# Patient Record
Sex: FEMALE | Race: BLACK OR AFRICAN AMERICAN | NOT HISPANIC OR LATINO | Employment: FULL TIME | URBAN - METROPOLITAN AREA
[De-identification: names, ages, dates, MRNs, and addresses within clinical notes are randomized per-mention and may not be internally consistent; named-entity substitution may affect disease eponyms.]

---

## 2023-10-18 ENCOUNTER — OFFICE VISIT (OUTPATIENT)
Dept: PRIMARY CARE | Facility: CLINIC | Age: 43
End: 2023-10-18
Payer: COMMERCIAL

## 2023-10-18 ENCOUNTER — LAB (OUTPATIENT)
Dept: LAB | Facility: LAB | Age: 43
End: 2023-10-18
Payer: COMMERCIAL

## 2023-10-18 VITALS — DIASTOLIC BLOOD PRESSURE: 69 MMHG | HEART RATE: 80 BPM | SYSTOLIC BLOOD PRESSURE: 111 MMHG

## 2023-10-18 DIAGNOSIS — R79.89 ABNORMAL LFTS: ICD-10-CM

## 2023-10-18 DIAGNOSIS — N92.0 MENORRHAGIA WITH REGULAR CYCLE: Primary | ICD-10-CM

## 2023-10-18 DIAGNOSIS — D50.0 IRON DEFICIENCY ANEMIA DUE TO CHRONIC BLOOD LOSS: ICD-10-CM

## 2023-10-18 LAB
ALBUMIN SERPL BCP-MCNC: 3.9 G/DL (ref 3.4–5)
ALP SERPL-CCNC: 292 U/L (ref 33–110)
ALT SERPL W P-5'-P-CCNC: 27 U/L (ref 7–45)
ANION GAP SERPL CALC-SCNC: 13 MMOL/L (ref 10–20)
AST SERPL W P-5'-P-CCNC: 38 U/L (ref 9–39)
BASOPHILS # BLD AUTO: 0.04 X10*3/UL (ref 0–0.1)
BASOPHILS NFR BLD AUTO: 0.4 %
BILIRUB SERPL-MCNC: 0.3 MG/DL (ref 0–1.2)
BUN SERPL-MCNC: 9 MG/DL (ref 6–23)
CALCIUM SERPL-MCNC: 9.3 MG/DL (ref 8.6–10.6)
CHLORIDE SERPL-SCNC: 104 MMOL/L (ref 98–107)
CO2 SERPL-SCNC: 25 MMOL/L (ref 21–32)
CREAT SERPL-MCNC: 0.55 MG/DL (ref 0.5–1.05)
EOSINOPHIL # BLD AUTO: 0.15 X10*3/UL (ref 0–0.7)
EOSINOPHIL NFR BLD AUTO: 1.6 %
ERYTHROCYTE [DISTWIDTH] IN BLOOD BY AUTOMATED COUNT: 17.2 % (ref 11.5–14.5)
GFR SERPL CREATININE-BSD FRML MDRD: >90 ML/MIN/1.73M*2
GLUCOSE SERPL-MCNC: 82 MG/DL (ref 74–99)
HCT VFR BLD AUTO: 29.5 % (ref 36–46)
HGB BLD-MCNC: 8.2 G/DL (ref 12–16)
IMM GRANULOCYTES # BLD AUTO: 0.04 X10*3/UL (ref 0–0.7)
IMM GRANULOCYTES NFR BLD AUTO: 0.4 % (ref 0–0.9)
IRON SATN MFR SERPL: 4 % (ref 25–45)
IRON SERPL-MCNC: 20 UG/DL (ref 35–150)
LYMPHOCYTES # BLD AUTO: 3.12 X10*3/UL (ref 1.2–4.8)
LYMPHOCYTES NFR BLD AUTO: 33.7 %
MCH RBC QN AUTO: 20.8 PG (ref 26–34)
MCHC RBC AUTO-ENTMCNC: 27.8 G/DL (ref 32–36)
MCV RBC AUTO: 75 FL (ref 80–100)
MONOCYTES # BLD AUTO: 0.84 X10*3/UL (ref 0.1–1)
MONOCYTES NFR BLD AUTO: 9.1 %
NEUTROPHILS # BLD AUTO: 5.06 X10*3/UL (ref 1.2–7.7)
NEUTROPHILS NFR BLD AUTO: 54.8 %
NRBC BLD-RTO: 0 /100 WBCS (ref 0–0)
PLATELET # BLD AUTO: 383 X10*3/UL (ref 150–450)
PMV BLD AUTO: 10.4 FL (ref 7.5–11.5)
POTASSIUM SERPL-SCNC: 4.2 MMOL/L (ref 3.5–5.3)
PROT SERPL-MCNC: 8.2 G/DL (ref 6.4–8.2)
RBC # BLD AUTO: 3.94 X10*6/UL (ref 4–5.2)
SODIUM SERPL-SCNC: 138 MMOL/L (ref 136–145)
TIBC SERPL-MCNC: 466 UG/DL (ref 240–445)
UIBC SERPL-MCNC: 446 UG/DL (ref 110–370)
WBC # BLD AUTO: 9.3 X10*3/UL (ref 4.4–11.3)

## 2023-10-18 PROCEDURE — 83540 ASSAY OF IRON: CPT

## 2023-10-18 PROCEDURE — 85025 COMPLETE CBC W/AUTO DIFF WBC: CPT

## 2023-10-18 PROCEDURE — 36415 COLL VENOUS BLD VENIPUNCTURE: CPT

## 2023-10-18 PROCEDURE — 83550 IRON BINDING TEST: CPT

## 2023-10-18 PROCEDURE — 99204 OFFICE O/P NEW MOD 45 MIN: CPT | Performed by: STUDENT IN AN ORGANIZED HEALTH CARE EDUCATION/TRAINING PROGRAM

## 2023-10-18 PROCEDURE — 80053 COMPREHEN METABOLIC PANEL: CPT

## 2023-10-18 RX ORDER — ASCORBIC ACID 500 MG
500 TABLET ORAL DAILY
Qty: 30 TABLET | Refills: 11 | Status: SHIPPED | OUTPATIENT
Start: 2023-10-18 | End: 2024-10-17

## 2023-10-18 RX ORDER — DOCUSATE SODIUM 100 MG/1
100 CAPSULE, LIQUID FILLED ORAL 2 TIMES DAILY PRN
Qty: 60 CAPSULE | Refills: 0 | Status: SHIPPED | OUTPATIENT
Start: 2023-10-18

## 2023-10-18 RX ORDER — FERROUS SULFATE 325(65) MG
1 TABLET ORAL
COMMUNITY
Start: 2018-11-14

## 2023-10-18 NOTE — PROGRESS NOTES
Subjective   Patient ID: Idalia Ibarra is a 42 y.o. female who presents for EST CARE   HPI  Kori is 42 years old with known iron deficiency anemia, hyperlipidemia.  Reports she recently saw another doctor and got her blood work done which showed iron deficiency, hyperlipidemia and abnormal LFT.  Was given iron supplements and vitamin C however wants to further know what caused this.  Denies any fatigue or nausea or vomiting or changes in bowel habits      MEDICAL HISTORY   Was on seizures meds- stipped taking them since a year   C sections X s  Hospitalixations for seizures   Family History   Problem Relation Name Age of Onset    Diabetes Neg Hx      Hypertension Neg Hx      Heart disease Neg Hx      Anemia Neg Hx      Cancer Neg Hx        Allergies   Allergen Reactions    Shellfish Containing Products Anaphylaxis        Menstrual history:   Regular   5 days   Clots +  Lmp: August - 14th    Review of Systems   Constitutional:  Negative for activity change and fever.   HENT:  Negative for congestion.    Respiratory:  Negative for cough, shortness of breath and wheezing.    Cardiovascular:  Negative for chest pain and leg swelling.   Gastrointestinal:  Negative for abdominal pain, constipation, nausea and vomiting.   Endocrine: Negative for cold intolerance.   Genitourinary:  Negative for dysuria, hematuria and urgency.   Neurological:  Negative for dizziness, speech difficulty, weakness and numbness.   Psychiatric/Behavioral:  Negative for self-injury and suicidal ideas.        Objective   Visit Vitals  /69   Pulse 80      Physical Exam  Constitutional:       Appearance: Normal appearance.   HENT:      Head: Normocephalic and atraumatic.      Nose: Nose normal.      Mouth/Throat:      Mouth: Mucous membranes are moist.   Eyes:      Conjunctiva/sclera: Conjunctivae normal.      Pupils: Pupils are equal, round, and reactive to light.   Cardiovascular:      Rate and Rhythm: Normal rate and regular rhythm.       Pulses: Normal pulses.      Heart sounds: Normal heart sounds.   Pulmonary:      Effort: Pulmonary effort is normal.      Breath sounds: Normal breath sounds.   Musculoskeletal:         General: Normal range of motion.      Cervical back: Neck supple.   Skin:     General: Skin is warm.   Neurological:      General: No focal deficit present.      Mental Status: She is alert and oriented to person, place, and time.   Psychiatric:         Mood and Affect: Mood normal.         Behavior: Behavior normal.         Thought Content: Thought content normal.         Judgment: Judgment normal.         Assessment/Plan   Diagnoses and all orders for this visit:  Menorrhagia with regular cycle  -     Referral to Gynecology; Future  -     US PELVIS TRANSABDOMINAL WITH TRANSVAGINAL; Future  Iron deficiency anemia due to chronic blood loss  -     Referral to Gynecology; Future  -     Iron and TIBC; Future  -     CBC and Auto Differential; Future  -     Comprehensive metabolic panel; Future  -     ascorbic acid (Vitamin C) 500 mg tablet; Take 1 tablet (500 mg) by mouth once daily.  -     docusate sodium (Colace) 100 mg capsule; Take 1 capsule (100 mg) by mouth 2 times a day as needed for constipation.  -     US PELVIS TRANSABDOMINAL WITH TRANSVAGINAL; Future  Abnormal LFTs  -     Comprehensive metabolic panel; Future  -     US abdomen limited liver; Future     Chart review does show patient has iron deficiency anemia along with hyperlipidemia and abnormal LFT including elevated ALP, 5 nucleotidase and GGT.  Advised patient to continue iron supplements along with vitamin C and use a stool softener.  Does endorse to have heavy menstrual cycles therefore advise ultrasound transvaginal and gynecology referral  As per LFT-does seem to be hepatic in origin.  We will get an ultrasound liver to evaluate this further    Once above results we will call the patient with abnormal results of any and discuss further evaluation and management.   Patient to seek medical attention immediately or call 911 if she does experience any chest pain or shortness of breath or focal neurological deficit.  Verbalizes understanding

## 2023-10-24 ENCOUNTER — ANCILLARY PROCEDURE (OUTPATIENT)
Dept: RADIOLOGY | Facility: CLINIC | Age: 43
End: 2023-10-24
Payer: COMMERCIAL

## 2023-10-24 ENCOUNTER — APPOINTMENT (OUTPATIENT)
Dept: RADIOLOGY | Facility: CLINIC | Age: 43
End: 2023-10-24
Payer: COMMERCIAL

## 2023-10-24 DIAGNOSIS — R79.89 ABNORMAL LFTS: ICD-10-CM

## 2023-10-24 PROCEDURE — 76705 ECHO EXAM OF ABDOMEN: CPT | Performed by: RADIOLOGY

## 2023-10-24 PROCEDURE — 76705 ECHO EXAM OF ABDOMEN: CPT

## 2023-11-06 ENCOUNTER — ANCILLARY PROCEDURE (OUTPATIENT)
Dept: RADIOLOGY | Facility: CLINIC | Age: 43
End: 2023-11-06
Payer: COMMERCIAL

## 2023-11-06 DIAGNOSIS — D50.0 IRON DEFICIENCY ANEMIA DUE TO CHRONIC BLOOD LOSS: ICD-10-CM

## 2023-11-06 DIAGNOSIS — N92.0 MENORRHAGIA WITH REGULAR CYCLE: ICD-10-CM

## 2023-11-06 PROCEDURE — 76830 TRANSVAGINAL US NON-OB: CPT | Performed by: RADIOLOGY

## 2023-11-06 PROCEDURE — 76856 US EXAM PELVIC COMPLETE: CPT

## 2023-11-06 PROCEDURE — 76856 US EXAM PELVIC COMPLETE: CPT | Performed by: RADIOLOGY

## 2023-11-11 ASSESSMENT — ENCOUNTER SYMPTOMS
ACTIVITY CHANGE: 0
NUMBNESS: 0
HEMATURIA: 0
COUGH: 0
CONSTIPATION: 0
SPEECH DIFFICULTY: 0
DYSURIA: 0
WHEEZING: 0
VOMITING: 0
ABDOMINAL PAIN: 0
DIZZINESS: 0
SHORTNESS OF BREATH: 0
NAUSEA: 0
WEAKNESS: 0
FEVER: 0

## 2023-11-13 ENCOUNTER — TELEPHONE (OUTPATIENT)
Dept: PRIMARY CARE | Facility: CLINIC | Age: 43
End: 2023-11-13
Payer: COMMERCIAL

## 2023-11-14 NOTE — TELEPHONE ENCOUNTER
Result Communication    Resulted Orders   US PELVIS TRANSABDOMINAL WITH TRANSVAGINAL    Narrative    Interpreted By:  Quentin Everett,   STUDY:  US PELVIS TRANSABDOMINAL WITH TRANSVAGINAL;  11/6/2023 4:35 pm      INDICATION:  Signs/Symptoms:fibroids; heavy menstrual cycles;.      COMPARISON:  CT abdomen/pelvis of 07/29/2021. No prior ultrasound available.      ACCESSION NUMBER(S):  [id number]      ORDERING CLINICIAN:  PAMELA LEA      TECHNIQUE:  Multiple multiplanar static gray scale, color and spectral waveform  sonographic images of the pelvis were obtained.  Transabdominal and  endovaginal ultrasound was performed.      FINDINGS:  UTERUS:  Uterus is normal in size with a smooth external contour measuring  9.7 x 7.3 x 5.5 cm. Centrally within the uterus at the endometrial  complex level there is a solid-appearing hyperechoic masslike  structure measuring 2.4 x 1.5 x 1.6 cm with intrinsic color Doppler  blood flow which could represent a submucosal fibroid versus  endometrial polyp. Also centrally there is a more heterogeneous mixed  echogenicity lesion measuring 2.8 x 2 x 2.5 cm with some acoustic  shadowing possibly representing a submucosal fibroid with intrinsic  calcification. There is a uterine body intramural heterogeneously  hypoechoic lesion compatible with a fibroid measuring 1.9 x 1.9 x 2  cm as well as a posterior uterine body intramural hypoechoic 2.4 x  2.3 x 2.8 cm fibroid.      RIGHT ADNEXA:  Right ovary could not be identified on transabdominal or transvaginal  views.      LEFT ADNEXA:  Left ovary could not be identified on transabdominal or transvaginal  views.      CUL DE SAC:  No free fluid is visualized.        Impression    1.  Central uterine 2.4 cm hyperechoic solid masslike lesion at the  endometrial level may represent an endometrial polyp or submucosal  fibroid. Endometrial neoplasm can not be fully excluded.  2. Additional mixed echogenicity lesion centrally measuring up to  2.8  cm could represent a submucosal fibroid with some intrinsic  calcification.  3. Several additional intramural fibroids. Component of adenomyosis  also may be present.  4. Nonvisualization of the ovaries.      MACRO:  None.      Signed by: Quentin Everett 11/7/2023 6:46 PM  Dictation workstation:   [id number]       9:38 AM    Follow up gyn   Continue iron supplements  Clinical monitoring of alk phos

## 2023-11-21 ENCOUNTER — OFFICE VISIT (OUTPATIENT)
Dept: OBSTETRICS AND GYNECOLOGY | Facility: CLINIC | Age: 43
End: 2023-11-21
Payer: COMMERCIAL

## 2023-11-21 ENCOUNTER — PREP FOR PROCEDURE (OUTPATIENT)
Dept: OBSTETRICS AND GYNECOLOGY | Facility: CLINIC | Age: 43
End: 2023-11-21

## 2023-11-21 VITALS
WEIGHT: 163 LBS | BODY MASS INDEX: 24.71 KG/M2 | SYSTOLIC BLOOD PRESSURE: 120 MMHG | DIASTOLIC BLOOD PRESSURE: 78 MMHG | HEIGHT: 68 IN

## 2023-11-21 DIAGNOSIS — N92.1 MENORRHAGIA WITH IRREGULAR CYCLE: ICD-10-CM

## 2023-11-21 DIAGNOSIS — D50.0 IRON DEFICIENCY ANEMIA DUE TO CHRONIC BLOOD LOSS: ICD-10-CM

## 2023-11-21 DIAGNOSIS — N92.1 MENORRHAGIA WITH IRREGULAR CYCLE: Primary | ICD-10-CM

## 2023-11-21 DIAGNOSIS — D25.0 SUBMUCOUS UTERINE FIBROID: ICD-10-CM

## 2023-11-21 DIAGNOSIS — D25.0 SUBMUCOUS UTERINE FIBROID: Primary | ICD-10-CM

## 2023-11-21 PROCEDURE — 1036F TOBACCO NON-USER: CPT | Performed by: OBSTETRICS & GYNECOLOGY

## 2023-11-21 PROCEDURE — 99204 OFFICE O/P NEW MOD 45 MIN: CPT | Performed by: OBSTETRICS & GYNECOLOGY

## 2023-11-21 RX ORDER — CELECOXIB 50 MG/1
400 CAPSULE ORAL ONCE
Status: CANCELLED | OUTPATIENT
Start: 2023-11-21 | End: 2023-11-21

## 2023-11-21 RX ORDER — GABAPENTIN 600 MG/1
600 TABLET ORAL ONCE
Status: CANCELLED | OUTPATIENT
Start: 2023-11-21 | End: 2023-11-21

## 2023-11-21 RX ORDER — IBUPROFEN 800 MG/1
800 TABLET ORAL EVERY 8 HOURS PRN
COMMUNITY

## 2023-11-21 RX ORDER — ACETAMINOPHEN 325 MG/1
975 TABLET ORAL ONCE
Status: CANCELLED | OUTPATIENT
Start: 2023-11-21 | End: 2023-11-21

## 2023-11-21 RX ORDER — ACETAMINOPHEN 325 MG/1
650 TABLET ORAL EVERY 6 HOURS PRN
COMMUNITY

## 2023-11-21 RX ORDER — MULTIVITAMIN WITH FOLIC ACID 400 MCG
1 TABLET ORAL DAILY
COMMUNITY
Start: 2023-11-01

## 2023-11-21 ASSESSMENT — PAIN SCALES - GENERAL: PAINLEVEL: 0-NO PAIN

## 2023-11-21 NOTE — PROGRESS NOTES
SUBJECTIVE    42 y.o.  Having periods female presents for   Chief Complaint   Patient presents with    New Patient Visit     New pt is here re: uterine fibroids.  Last pap:  2021  neg hpv ([facility])  Last chirag:  2023  cat 2  Accepts chaperone.   Selam KENDRA Cortez     Pt presents for evaluation of fibroids and anemia.  Her cycles are irregular-- can be every other month.  Cycles last 7 days. Wears pads, changing 6 pads/day.  Passes quarter sized clots.  Has had to change her clothing and pajamas.  Menses have been heavy like this for the past few months.    Review of outside data included-  Ultrasound: Uterus is normal in size with a smooth external contour measuring 9.7 x 7.3 x 5.5 cm. Centrally within the uterus at the endometrial complex level there is a solid-appearing hyperechoic mass like structure measuring 2.4 x 1.5 x 1.6 cm with intrinsic color Doppler blood flow which could represent a submucosal fibroid versus endometrial polyp. Also centrally there is a more heterogeneous mixed echogenicity lesion measuring 2.8 x 2 x 2.5 cm with some acoustic shadowing possibly representing a submucosal fibroid with intrinsic calcification. There is a uterine body intramural heterogeneously hypoechoic lesion compatible with a fibroid measuring 1.9 x  .9 x 2 cm as well as a posterior uterine body intramural hypoechoic 2.4 x 2.3 x 2.8 cm fibroid.    Hgb: 8.2 (she has started oral iron).    OB/GYN History  Patient's last menstrual period was 10/16/2023 (approximate).    Social History     Substance and Sexual Activity   Sexual Activity Yes    Partners: Male    Birth control/protection: None         OB History    Para Term  AB Living   5 4 3 1 1 4   SAB IAB Ectopic Multiple Live Births   1       4      # Outcome Date GA Lbr Tucker/2nd Weight Sex Delivery Anes PTL Lv   5 Term 04    M CS-LTranv   KRYSTEN   4  03    F CS-LTranv  Y KRYSTEN   3 Term 11/10/00    M Vag-Spont   KRYSTEN   2 Term  "99    F Vag-Spont  N KRYSTEN   1 SAB                Past Medical History  She has no past medical history on file.    Surgical History   section x3     Social History  She reports that she has never smoked. She has never used smokeless tobacco. She reports that she does not currently use alcohol. She reports that she does not use drugs.    Screenings  Social Determinants of Health     Tobacco Use: Low Risk  (2023)    Patient History     Smoking Tobacco Use: Never     Smokeless Tobacco Use: Never     Passive Exposure: Not on file   Alcohol Use: Not on file   Financial Resource Strain: Not on file   Food Insecurity: Not on file   Transportation Needs: Not on file   Physical Activity: Not on file   Stress: Not on file   Social Connections: Not on file   Intimate Partner Violence: Not on file   Depression: Not on file   Housing Stability: Not on file   Utilities: Not on file   Digital Equity: Not on file         OBJECTIVE  Vitals:    23 0850   BP: 120/78   Weight: 73.9 kg (163 lb)   Height: 1.715 m (5' 7.5\")     Body mass index is 25.15 kg/m².     Physical Exam  Constitutional:       Appearance: Normal appearance.   Genitourinary:      No lesions in the vagina.      Right Labia: No rash, tenderness or lesions.     Left Labia: No tenderness, lesions or rash.     No vaginal erythema, bleeding or ulceration.      No cervical discharge, friability or lesion.      Uterus is enlarged (about 10-12 weeks).      Uterus is not fixed.   Abdominal:      General: Abdomen is flat. There is no distension.      Tenderness: There is no abdominal tenderness.   Neurological:      Mental Status: She is alert.          Last Pap:  normal/neg    Immunization History   Administered Date(s) Administered    Moderna SARS-CoV-2 Vaccination 10/01/2021        ASSESSMENT & PLAN  Problem List Items Addressed This Visit          Ob-Gyn Problems    Submucous uterine fibroid - Primary       Other    Iron deficiency anemia due to " chronic blood loss     Other Visit Diagnoses       Menorrhagia with irregular cycle                Follow up: Reviewed options of conservative follow up, medical therapy, or surgery-- hysteroscopic resection of fibroid vs hysterectomy. Pt would like to pursue hysteroscopy with fibroid resection with post-op medical management with OCPs or progesterones. Risks, benefits, and alternatives were reviewed. Will submit a prep for case.    Miguel Courtney MD  Obstetrics & Gynecology  11/21/23

## 2023-12-04 ENCOUNTER — APPOINTMENT (OUTPATIENT)
Dept: OBSTETRICS AND GYNECOLOGY | Facility: CLINIC | Age: 43
End: 2023-12-04
Payer: COMMERCIAL

## 2023-12-06 NOTE — CPM/PAT H&P
CPM/PAT Evaluation       Name: Idalia Ibarra (Idalia Ibarra)  /Age: 1980/43 y.o.     TELEMEDICINE ENCOUNTER  Patient was contacted by telephone for preadmission testing perioperative risk assessment prior to surgery.    CHIEF COMPLAINT  Menorrhagia with irregular cycles, uterine fibroid    HPI  Patient is a 43-year-old female complaining of menorrhagia irregular cycles.  States she has to change superabsorbent pads about 6 times a day and is passing large clots which bleed through her clothing and pajamas.  Patient had transvaginal ultrasound done which revealed several uterine fibroids.  Patient now scheduled for hysteroscopy with excision of tissue on 2023 at [facility].  Patient was also started on oral iron for hemoglobin of 8.2.      ACTIVE PROBLEMS  Patient Active Problem List   Diagnosis    Submucous uterine fibroid    Iron deficiency anemia due to chronic blood loss    Menorrhagia with irregular cycle     PAST MEDICAL HISTORY  Past Medical History:   Diagnosis Date    Acid reflux     Anemia     CBC from 10/18/2023 hemoglobin 8.2; hematocrit 29.5    History of seizures     Last seizure was 2 years ago in 2021.  Patient states they are stress-induced, and since that time she is employed lifestyle changes to reduce her stress load.  Patient was prescribed Dilantin at that time which she took for 2 months and discontinued on her own.  Patient states in  she was having weekly seizures.     SURGICAL HISTORY  Past Surgical History:   Procedure Laterality Date     SECTION, LOW TRANSVERSE       SECTION, LOW TRANSVERSE  2003     SECTION, LOW TRANSVERSE  2004     ANESTHESIA HISTORY  Denies problems with anesthesia in the past such as PONV, prolonged sedation, awareness, dental damage, aspiration, cardiac arrest, difficult intubation, or unexpected hospital admissions.  Denies family history of malignant hyperthermia, or pseudocholinesterase  deficiency.    SOCIAL HISTORY  Patient is single, mother of 4 works as a   Patient is a never smoker, denies alcohol, medical marijuana, or recreational drug use.  Patient does not exercise but states she is able to do moderate ADLs such as heavy housework, light yard work.  Patient states she can walk up a flight of stairs without shortness of breath but will get short of breath with 2 flights of stairs.  METS 4    FAMILY HISTORY  Family History   Problem Relation Name Age of Onset    Diabetes Neg Hx      Hypertension Neg Hx      Heart disease Neg Hx      Anemia Neg Hx      Cancer Neg Hx       ALLERGIES  Allergies   Allergen Reactions    Shellfish Containing Products Anaphylaxis and Swelling    Lobster Swelling    Pollen Extracts Itching     MEDICATIONS  No current facility-administered medications for this encounter.    Current Outpatient Medications:     acetaminophen (Tylenol) 325 mg tablet, Take 2 tablets (650 mg) by mouth every 6 hours if needed., Disp: , Rfl:     ascorbic acid (Vitamin C) 500 mg tablet, Take 1 tablet (500 mg) by mouth once daily., Disp: 30 tablet, Rfl: 11    Daily-Teodoro, with folic acid, 400 mcg tablet, Take 1 tablet by mouth once daily., Disp: , Rfl:     docusate sodium (Colace) 100 mg capsule, Take 1 capsule (100 mg) by mouth 2 times a day as needed for constipation., Disp: 60 capsule, Rfl: 0    ferrous sulfate 325 (65 Fe) MG tablet, Take 1 tablet (325 mg) by mouth 3 times a day with meals., Disp: , Rfl:     ibuprofen 800 mg tablet, Take 1 tablet (800 mg) by mouth every 8 hours if needed., Disp: , Rfl:     PHYSICAL EXAM  Deferred    AIRWAY EXAM  Deferred    VITALS  No vitals taken for telemedicine visit  Height: 5 feet 7-1/2 inches; weight: 163 pounds; BMI: 25.15    LABS  Lab Results   Component Value Date    WBC 9.3 10/18/2023    HGB 8.2 (L) 10/18/2023    HCT 29.5 (L) 10/18/2023    MCV 75 (L) 10/18/2023     10/18/2023     Lab Results   Component Value Date    GLUCOSE 82  10/18/2023    CALCIUM 9.3 10/18/2023     10/18/2023    K 4.2 10/18/2023    CO2 25 10/18/2023     10/18/2023    BUN 9 10/18/2023    CREATININE 0.55 10/18/2023     IMAGING  EKG from 02/21/2021  Interpretation Summary    Normal sinus rhythm  Incomplete right bundle branch block  Borderline ECG  When compared with ECG of 21-FEB-2021 14:55,  No significant change was found  Confirmed by LAMIN MARES PA-C ([id number]) on 2/22/2021 2:21:37 AM  Measurements    P Axis 55 degrees         P Offset 203 ms         OK Interval 128 ms         Q Onset 221 ms         QTC Calculation(Bazett) 432 ms         QTC Fredericia 418 ms         R Axis 40 degrees          T Axis 49 degrees         Ventricular Rate 74 BPM         Atrial Rate 74 BPM         P Onset 157 ms          QRS Count 12 beats         QRS Duration 102 ms         QT Interval 390 ms         T Offset 416 ms                ASSESSMENT/PLAN  Menorrhagia with irregular cycles, uterine fibroids  Hysteroscopy with excision of tissue        This note was created in part upon personal review of patient's medical records.

## 2023-12-06 NOTE — PREPROCEDURE INSTRUCTIONS
Pre-Op Instructions & Checklist  Your surgery has been scheduled at [facility] at [address] [place], [address], [address], in the [facility]. Parking is to the left of the main entrance.  You will be contacted about the time of your surgery the day before your surgery. If you are unable to answer the phone, a detailed voicemail message will be left. Make sure that your voicemail box is not full so a message can be left. If you have not received a call by 3:00 pm you may call [phone] between the hours of 3:00 and 4:00 pm. Please be available by phone the night before/day of surgery in case there is a change in the schedule which may require you to arrive earlier/later.  14 DAYS BEFORE SURGERY STOP TAKING WEIGHT LOSS MEDICATIONS     7 DAYS BEFORE SURGERY STOP THESE MEDICATIONS:  Multiple Vitamins containing Vitamin E  Herbal supplements, Fish Oil, garlic pills, turmeric  Stop taking aspirin, and aspirin-containing products as well as NSAID's such as Advil, Motrin, Aleve, Ibuprofen. Tylenol is okay to take for pain relief.   If you are currently taking Coumadin/Warfarin, we will have to coordinate that with your PCP &/or the Anticoagulation Clinic.       THE DAY BEFORE SURGERY:  *Do not eat any food after midnight the night before surgery.   *You are permitted to have clear liquids such as water, apple juice, plain tea or coffee (no milk or creamer), clear electrolyte-replenishing drinks such as Pedialyte, Gatorade, or Powerade (not yogurt or pulp-containing smoothies or juices such as orange juice) up to 2 hours before your surgery.    DAY OF SURGERY, TAKE THESE MEDICATIONS with a small sip of water (if it is not listed, do not take it):    There are no medications for you to take the morning of surgery.     ON THE MORNING OF SURGERY:  *Shower either the night before your surgery or the morning of your surgery  *Do not use moisturizers, creams, lotions or perfume, or  make-up.  *Wear comfortable, loose fitting clothing.   *All jewelry and valuables should be left at home.  *Prosthetic devices such as contact lenses, hearing aids, dentures, eyelash extensions, hairpins and body piercing must be removed before surgery. Bring containers for eyeglasses/contacts, dentures, or hearing aids with you.  Diabetics: Please check fasting blood sugars upon waking up.  If fasting blood sugars are<80ml/dl, please drink 3 ounces of apple juice no later than 2 hours prior to surgery.    BRING WITH YOU:  *Photo ID and insurance card  *Current list of medicines and allergies  *Pacemaker/Defibrillator/Heart stent cards  *Copy of your complete Advanced Directive/DHPOA-if applicable      SMOKING:  *Quitting smoking can make a huge difference to your health and recovery from surgery.    *If you need help with quitting, call [phone]-QUIT-NOW.    Alcohol:  *No alcoholic beverages for 48 hours before surgery.    AFTER OUTPATIENT SURGERY:  *A responsible adult MUST accompany you at the time of discharge and stay with you for 24 hours after your surgery.  *You may NOT drive yourself home after surgery.  *You may use a taxi or ride sharing service (Lyft, Uber) to return home ONLY if you are accompanied by a friend or family member.  *Instructions for resuming your medications will be provided by your surgeon.    CONTACT SURGEON'S OFFICE IF YOU DEVELOP:  * Fever =/> 100.4 F   * New respiratory symptoms (e.g. cough, shortness of breath, respiratory distress, sore throat)  * Recent loss of taste or smell  *Flu like symptoms such as headache, fatigue or gastrointestinal symptoms  * If you develop any open sores, shingles, burning or painful urination   AND/OR:  * You no longer wish to have the surgery.  * Any other personal circumstances change that may lead to the need to cancel or defer this surgery.  *You were admitted to any hospital within one week of your planned procedure.      If you have any questions  regarding these preoperative instructions you may call [phone]. If you have questions regarding you surgical procedure, or post-operative care/recovery please call your surgeon's office.     Link to [facility]  [email or web address]

## 2023-12-18 ENCOUNTER — ANESTHESIA EVENT (OUTPATIENT)
Dept: OPERATING ROOM | Facility: CLINIC | Age: 43
End: 2023-12-18
Payer: COMMERCIAL

## 2023-12-19 ENCOUNTER — ANESTHESIA (OUTPATIENT)
Dept: OPERATING ROOM | Facility: CLINIC | Age: 43
End: 2023-12-19
Payer: COMMERCIAL

## 2023-12-19 ENCOUNTER — HOSPITAL ENCOUNTER (OUTPATIENT)
Facility: CLINIC | Age: 43
Setting detail: OUTPATIENT SURGERY
Discharge: HOME | End: 2023-12-19
Attending: OBSTETRICS & GYNECOLOGY | Admitting: OBSTETRICS & GYNECOLOGY
Payer: COMMERCIAL

## 2023-12-19 VITALS
OXYGEN SATURATION: 100 % | DIASTOLIC BLOOD PRESSURE: 75 MMHG | SYSTOLIC BLOOD PRESSURE: 119 MMHG | TEMPERATURE: 96.8 F | RESPIRATION RATE: 16 BRPM | HEIGHT: 67 IN | BODY MASS INDEX: 25.26 KG/M2 | HEART RATE: 56 BPM | WEIGHT: 160.94 LBS

## 2023-12-19 DIAGNOSIS — N92.1 MENORRHAGIA WITH IRREGULAR CYCLE: Primary | ICD-10-CM

## 2023-12-19 DIAGNOSIS — D25.0 SUBMUCOUS UTERINE FIBROID: ICD-10-CM

## 2023-12-19 PROBLEM — Z98.890 HISTORY OF GENERAL ANESTHESIA: Status: ACTIVE | Noted: 2023-12-19

## 2023-12-19 LAB — GLUCOSE BLD MANUAL STRIP-MCNC: 92 MG/DL (ref 74–99)

## 2023-12-19 PROCEDURE — 3700000002 HC GENERAL ANESTHESIA TIME - EACH INCREMENTAL 1 MINUTE: Performed by: OBSTETRICS & GYNECOLOGY

## 2023-12-19 PROCEDURE — 2500000004 HC RX 250 GENERAL PHARMACY W/ HCPCS (ALT 636 FOR OP/ED): Mod: SE

## 2023-12-19 PROCEDURE — 2500000004 HC RX 250 GENERAL PHARMACY W/ HCPCS (ALT 636 FOR OP/ED): Mod: SE | Performed by: ANESTHESIOLOGY

## 2023-12-19 PROCEDURE — 3600000008 HC OR TIME - EACH INCREMENTAL 1 MINUTE - PROCEDURE LEVEL THREE: Performed by: OBSTETRICS & GYNECOLOGY

## 2023-12-19 PROCEDURE — 2500000004 HC RX 250 GENERAL PHARMACY W/ HCPCS (ALT 636 FOR OP/ED): Mod: SE | Performed by: STUDENT IN AN ORGANIZED HEALTH CARE EDUCATION/TRAINING PROGRAM

## 2023-12-19 PROCEDURE — 7100000009 HC PHASE TWO TIME - INITIAL BASE CHARGE: Performed by: OBSTETRICS & GYNECOLOGY

## 2023-12-19 PROCEDURE — 88305 TISSUE EXAM BY PATHOLOGIST: CPT | Mod: TC,SUR | Performed by: OBSTETRICS & GYNECOLOGY

## 2023-12-19 PROCEDURE — 82947 ASSAY GLUCOSE BLOOD QUANT: CPT

## 2023-12-19 PROCEDURE — 2500000005 HC RX 250 GENERAL PHARMACY W/O HCPCS: Mod: SE

## 2023-12-19 PROCEDURE — 7100000010 HC PHASE TWO TIME - EACH INCREMENTAL 1 MINUTE: Performed by: OBSTETRICS & GYNECOLOGY

## 2023-12-19 PROCEDURE — 2500000001 HC RX 250 WO HCPCS SELF ADMINISTERED DRUGS (ALT 637 FOR MEDICARE OP): Mod: SE | Performed by: OBSTETRICS & GYNECOLOGY

## 2023-12-19 PROCEDURE — 3700000001 HC GENERAL ANESTHESIA TIME - INITIAL BASE CHARGE: Performed by: OBSTETRICS & GYNECOLOGY

## 2023-12-19 PROCEDURE — 3600000003 HC OR TIME - INITIAL BASE CHARGE - PROCEDURE LEVEL THREE: Performed by: OBSTETRICS & GYNECOLOGY

## 2023-12-19 PROCEDURE — 58561 HYSTEROSCOPY REMOVE MYOMA: CPT | Performed by: OBSTETRICS & GYNECOLOGY

## 2023-12-19 PROCEDURE — 2720000007 HC OR 272 NO HCPCS: Performed by: OBSTETRICS & GYNECOLOGY

## 2023-12-19 PROCEDURE — 88305 TISSUE EXAM BY PATHOLOGIST: CPT | Performed by: STUDENT IN AN ORGANIZED HEALTH CARE EDUCATION/TRAINING PROGRAM

## 2023-12-19 PROCEDURE — A4217 STERILE WATER/SALINE, 500 ML: HCPCS | Mod: SE,MUE | Performed by: OBSTETRICS & GYNECOLOGY

## 2023-12-19 PROCEDURE — 2500000004 HC RX 250 GENERAL PHARMACY W/ HCPCS (ALT 636 FOR OP/ED): Mod: SE,MUE | Performed by: OBSTETRICS & GYNECOLOGY

## 2023-12-19 RX ORDER — SODIUM CHLORIDE, SODIUM LACTATE, POTASSIUM CHLORIDE, CALCIUM CHLORIDE 600; 310; 30; 20 MG/100ML; MG/100ML; MG/100ML; MG/100ML
100 INJECTION, SOLUTION INTRAVENOUS CONTINUOUS
Status: DISCONTINUED | OUTPATIENT
Start: 2023-12-19 | End: 2023-12-19 | Stop reason: HOSPADM

## 2023-12-19 RX ORDER — ACETAMINOPHEN 325 MG/1
975 TABLET ORAL ONCE
Status: COMPLETED | OUTPATIENT
Start: 2023-12-19 | End: 2023-12-19

## 2023-12-19 RX ORDER — MIDAZOLAM HYDROCHLORIDE 1 MG/ML
INJECTION INTRAMUSCULAR; INTRAVENOUS AS NEEDED
Status: DISCONTINUED | OUTPATIENT
Start: 2023-12-19 | End: 2023-12-19

## 2023-12-19 RX ORDER — LIDOCAINE IN NACL,ISO-OSMOT/PF 30 MG/3 ML
0.1 SYRINGE (ML) INJECTION ONCE
Status: DISCONTINUED | OUTPATIENT
Start: 2023-12-19 | End: 2023-12-19 | Stop reason: HOSPADM

## 2023-12-19 RX ORDER — LIDOCAINE HYDROCHLORIDE 20 MG/ML
INJECTION, SOLUTION INFILTRATION; PERINEURAL AS NEEDED
Status: DISCONTINUED | OUTPATIENT
Start: 2023-12-19 | End: 2023-12-19

## 2023-12-19 RX ORDER — FENTANYL CITRATE 50 UG/ML
25 INJECTION, SOLUTION INTRAMUSCULAR; INTRAVENOUS EVERY 5 MIN PRN
Status: DISCONTINUED | OUTPATIENT
Start: 2023-12-19 | End: 2023-12-19 | Stop reason: HOSPADM

## 2023-12-19 RX ORDER — PROPOFOL 10 MG/ML
INJECTION, EMULSION INTRAVENOUS AS NEEDED
Status: DISCONTINUED | OUTPATIENT
Start: 2023-12-19 | End: 2023-12-19

## 2023-12-19 RX ORDER — OXYCODONE HYDROCHLORIDE 5 MG/1
5 TABLET ORAL EVERY 4 HOURS PRN
Status: DISCONTINUED | OUTPATIENT
Start: 2023-12-19 | End: 2023-12-19 | Stop reason: HOSPADM

## 2023-12-19 RX ORDER — KETOROLAC TROMETHAMINE 30 MG/ML
30 INJECTION, SOLUTION INTRAMUSCULAR; INTRAVENOUS ONCE
Status: COMPLETED | OUTPATIENT
Start: 2023-12-19 | End: 2023-12-19

## 2023-12-19 RX ORDER — CELECOXIB 200 MG/1
400 CAPSULE ORAL ONCE
Status: DISCONTINUED | OUTPATIENT
Start: 2023-12-19 | End: 2023-12-19 | Stop reason: HOSPADM

## 2023-12-19 RX ORDER — SODIUM CHLORIDE 0.9 G/100ML
IRRIGANT IRRIGATION AS NEEDED
Status: DISCONTINUED | OUTPATIENT
Start: 2023-12-19 | End: 2023-12-19 | Stop reason: HOSPADM

## 2023-12-19 RX ORDER — OXYCODONE HYDROCHLORIDE 10 MG/1
10 TABLET ORAL EVERY 4 HOURS PRN
Status: DISCONTINUED | OUTPATIENT
Start: 2023-12-19 | End: 2023-12-19 | Stop reason: HOSPADM

## 2023-12-19 RX ORDER — PROPOFOL 10 MG/ML
INJECTION, EMULSION INTRAVENOUS CONTINUOUS PRN
Status: DISCONTINUED | OUTPATIENT
Start: 2023-12-19 | End: 2023-12-19

## 2023-12-19 RX ORDER — GABAPENTIN 600 MG/1
600 TABLET ORAL ONCE
Status: COMPLETED | OUTPATIENT
Start: 2023-12-19 | End: 2023-12-19

## 2023-12-19 RX ORDER — FENTANYL CITRATE 50 UG/ML
INJECTION, SOLUTION INTRAMUSCULAR; INTRAVENOUS AS NEEDED
Status: DISCONTINUED | OUTPATIENT
Start: 2023-12-19 | End: 2023-12-19

## 2023-12-19 RX ORDER — ONDANSETRON HYDROCHLORIDE 2 MG/ML
4 INJECTION, SOLUTION INTRAVENOUS ONCE AS NEEDED
Status: COMPLETED | OUTPATIENT
Start: 2023-12-19 | End: 2023-12-19

## 2023-12-19 RX ADMIN — FENTANYL CITRATE 25 MCG: 50 INJECTION, SOLUTION INTRAMUSCULAR; INTRAVENOUS at 12:32

## 2023-12-19 RX ADMIN — MIDAZOLAM HYDROCHLORIDE 2 MG: 1 INJECTION, SOLUTION INTRAMUSCULAR; INTRAVENOUS at 12:24

## 2023-12-19 RX ADMIN — SODIUM CHLORIDE, SODIUM LACTATE, POTASSIUM CHLORIDE, AND CALCIUM CHLORIDE: .6; .31; .03; .02 INJECTION, SOLUTION INTRAVENOUS at 12:17

## 2023-12-19 RX ADMIN — KETOROLAC TROMETHAMINE 30 MG: 30 INJECTION, SOLUTION INTRAMUSCULAR; INTRAVENOUS at 15:00

## 2023-12-19 RX ADMIN — FENTANYL CITRATE 25 MCG: 50 INJECTION, SOLUTION INTRAMUSCULAR; INTRAVENOUS at 14:00

## 2023-12-19 RX ADMIN — PROPOFOL 30 MG: 10 INJECTION, EMULSION INTRAVENOUS at 12:32

## 2023-12-19 RX ADMIN — ONDANSETRON 4 MG: 2 INJECTION INTRAMUSCULAR; INTRAVENOUS at 13:55

## 2023-12-19 RX ADMIN — ACETAMINOPHEN 975 MG: 325 TABLET ORAL at 11:30

## 2023-12-19 RX ADMIN — PROPOFOL 140 MCG/KG/MIN: 10 INJECTION, EMULSION INTRAVENOUS at 12:25

## 2023-12-19 RX ADMIN — SODIUM CHLORIDE, POTASSIUM CHLORIDE, SODIUM LACTATE AND CALCIUM CHLORIDE 100 ML/HR: 600; 310; 30; 20 INJECTION, SOLUTION INTRAVENOUS at 11:30

## 2023-12-19 RX ADMIN — LIDOCAINE HYDROCHLORIDE 40 MG: 20 INJECTION, SOLUTION INFILTRATION; PERINEURAL at 12:24

## 2023-12-19 RX ADMIN — GABAPENTIN 600 MG: 600 TABLET, FILM COATED ORAL at 11:30

## 2023-12-19 RX ADMIN — PROPOFOL 50 MG: 10 INJECTION, EMULSION INTRAVENOUS at 12:24

## 2023-12-19 ASSESSMENT — PAIN SCALES - GENERAL
PAINLEVEL_OUTOF10: 10 - WORST POSSIBLE PAIN
PAINLEVEL_OUTOF10: 1
PAINLEVEL_OUTOF10: 10 - WORST POSSIBLE PAIN
PAINLEVEL_OUTOF10: 4
PAINLEVEL_OUTOF10: 1
PAINLEVEL_OUTOF10: 10 - WORST POSSIBLE PAIN
PAINLEVEL_OUTOF10: 0 - NO PAIN
PAINLEVEL_OUTOF10: 1
PAINLEVEL_OUTOF10: 0 - NO PAIN

## 2023-12-19 ASSESSMENT — PAIN - FUNCTIONAL ASSESSMENT
PAIN_FUNCTIONAL_ASSESSMENT: 0-10

## 2023-12-19 ASSESSMENT — PAIN DESCRIPTION - LOCATION
LOCATION: PELVIS
LOCATION: PELVIS

## 2023-12-19 ASSESSMENT — COLUMBIA-SUICIDE SEVERITY RATING SCALE - C-SSRS
1. IN THE PAST MONTH, HAVE YOU WISHED YOU WERE DEAD OR WISHED YOU COULD GO TO SLEEP AND NOT WAKE UP?: NO
6. HAVE YOU EVER DONE ANYTHING, STARTED TO DO ANYTHING, OR PREPARED TO DO ANYTHING TO END YOUR LIFE?: NO
2. HAVE YOU ACTUALLY HAD ANY THOUGHTS OF KILLING YOURSELF?: NO

## 2023-12-19 NOTE — ANESTHESIA PREPROCEDURE EVALUATION
Patient: Idalia Ibarra    Procedure Information       Date/Time: 12/19/23 1200    Procedure: Hysteroscopy with Excision Tissue - Have [employer] rep available.  This will likely be a longer case so I would consider blocking 60m instead of 40m.    [place] [facility] [facility] [place] [facility]    Surgeons: Miguel Courtney MD            Relevant Problems   Anesthesia   (+) History of general anesthesia      Hematology   (+) Iron deficiency anemia due to chronic blood loss       Clinical information reviewed:   Tobacco  Allergies  Meds   Med Hx  Surg Hx  OB Status  Fam Hx  Soc   Hx        NPO Detail:  NPO/Void Status  Date of Last Liquid: 12/18/23  Time of Last Liquid: 2200  Date of Last Solid: 12/18/23  Time of Last Solid: 2200  Last Intake Type: Food         Physical Exam    Airway  Mallampati: III     Cardiovascular   Rhythm: regular  Rate: normal     Dental - normal exam     Pulmonary   Breath sounds clear to auscultation     Abdominal            Anesthesia Plan    ASA 2     MAC     intravenous induction   Postoperative administration of opioids is intended.  Trial extubation is planned.  Anesthetic plan and risks discussed with patient.    Plan discussed with CAA.

## 2023-12-19 NOTE — ANESTHESIA POSTPROCEDURE EVALUATION
Patient: Idalia Ibarra    Procedure Summary       Date: 12/19/23 Room / [place] [facility] [place] [place] [facility] [place]    Anesthesia Start: 1217 Anesthesia Stop: 1334    Procedure: Hysteroscopy with Excision Tissue (Uterus) Diagnosis:       Menorrhagia with irregular cycle      Submucous uterine fibroid      (Menorrhagia with irregular cycle [N92.1])      (Submucous uterine fibroid [D25.0])    Surgeons: Miguel Courtney MD Responsible Provider: Jordyn Raza MD    Anesthesia Type: general ASA Status: 2            Anesthesia Type: general    Vitals Value Taken Time   /75 12/19/23 1554   Temp 36 12/19/23 1554   Pulse 57 12/19/23 1554   Resp 16 12/19/23 1554   SpO2 100 12/19/23 1554       Anesthesia Post Evaluation    Patient participation: complete - patient participated  Level of consciousness: sleepy but conscious  Pain management: adequate  Airway patency: patent  Cardiovascular status: acceptable and stable  Respiratory status: acceptable and room air  Hydration status: acceptable  Postoperative Nausea and Vomiting: mild        There were no known notable events for this encounter.

## 2023-12-19 NOTE — OP NOTE
Date: 2023  OR Location: [facility] – [place] [facility] OR    Name: Idalia Ibarra, : 1980, Age: 43 y.o., MRN: [id number], Sex: female    Diagnosis  Pre-op Diagnosis     * Menorrhagia with irregular cycle [N92.1]     * Submucous uterine fibroid [D25.0] Post-op Diagnosis     * Menorrhagia with irregular cycle [N92.1]     * Submucous uterine fibroid [D25.0]     Procedures  Hysteroscopy with Excision Tissue  37780 - [place] HYSTEROSCOPY REMOVAL LEIOMYOMATA      Surgeons      * Miguel Courtnye - Primary    Resident/Fellow/Other Assistant:  Surgeon(s) and Role:NA    Procedure Summary  Anesthesia: General  ASA: II  Anesthesia Staff: Anesthesiologist: MD KARINA Cabrera-AA: JAX Kimble  Estimated Blood Loss: 5mL  Intra-op Medications:   Medication Name Total Dose   sodium chloride 0.9 % irrigation solution 3,000 mL   surgical lubricant gel 1 Application              Anesthesia Record               Intraprocedure I/O Totals          Intake    .00 mL    Propofol Drip 0.00 mL    The total shown is the total volume documented since Anesthesia Start was filed.    Total Intake 600 mL          Specimen:   ID Type Source Tests Collected by Time   1 : Uterine Fibroids and Polyp Tissue FIBROID MYOMECTOMY SURGICAL PATHOLOGY EXAM Miguel Courtney MD 2023 1324        Staff:   Circulator: Verona Herrera RN  Scrub Person: Jessica Fuentes         Procedure Details:  Patient Name: Idalia Ibarra    MRN: [id number]  Log ID: [id number]    Surgery Date: 2023    Surgeon:      Honey Courtney - [place]    Pre-op Diagnosis:    1) Abnormal bleeding-- heavy menses  2) Endometrial polyp/fibroids    Post-op Diagnosis: same    Procedures and Anesthesia:  Procedure(s) and Anesthesia Type:     * Hysteroscopy with Excision Tissue - General-- resection of polyp and fibroids    Findings:  On hysteroscopy, there was what appeared to be a 1cm polyp arising from the posterior wall near the endocervical canal.  Two fibroids appeared to be present- one arising  from the left ostia (2cm) and one from the right lateral all (3.5cm). Tubal ostium were identified  bilaterally. There was no evidence of uterine perforation.    Estimated Blood Loss: 5cc      Specimens: Endometrial polyp and endometrial fibroid    Complications: None      Diagnosis Code(s): Pre-Op Diagnosis Codes:     * Menorrhagia with irregular cycle [N92.1]     * Submucous uterine fibroid [D25.0]    Indications: This is a 43 y.o. who presents with AUB and endometrial polyp/fibroids seen on ultrasound.  The proposed procedure, risks, benefits, and alternatives were discussed with the patient in detail including bleeding, infection, and damage to surrounding structures.  All the patient's questions were answered, and the patient voiced understanding.  The patient desires to proceed with surgery. The consents were signed.     OPERATIVE PROCEDURE:  Patient was taken to the OR, placed on the operative table in dorsolithotomy position with yellow fin stirrups.  Patient was prepped and draped in the usual fashion.  A sterile speculum was placed into the patient's vagina and cervix was grasped anteriorly with a single-toothed tenaculum.  The cervix was then sterilely  dilated with Matias dilators to allow passage of the Aveta morcellator. Normal saline was the hysteroscopic medium and the final deficit was 210cc. The hysteroscope was inserted through the cervix, into the uterine cavity where the findings above were seen.  The morcellator was used to remove the left sided fibroid and the lower segment polyp completely. Attention returned to the right sided fibroid, which was slowly resected and took approximately 30 minutes given the size and density of the tissue. Inspection, at this point, of endometrial cavity revealed that both ostia were able to be seen on panorama. There was no evidence at any point of uterine perforation.  The hysteroscope was removed and minimal bleeding noted after a  Ray-Ragini was used to clean  the vaginal vault and good hemostasis was noted.  All equipment was removed from the vaginal vault. Patient tolerated the procedure well and was taken to the PACU in stable condition.      Counts: Sponge, Lap, and Needle counts correct x 2 at the conclusion of the case. Dr. Major performed the procedure with the assistance of the resident/fellow.     Signature: Miguel Courtney MD  Date: December 19, 2023  Time: 1:41 PM     Complications:  None; patient tolerated the procedure well.     Disposition: PACU - hemodynamically stable.  Condition: stable

## 2023-12-19 NOTE — H&P
Subjective   Patient is a 43 y.o. female scheduled for hysteroscopy and planned polypectomy/myomectomy.      Her cycles are irregular-- can be every other month.  Cycles last 7 days. Wears pads, changing 6 pads/day.  Passes quarter sized clots.  Has had to change her clothing and pajamas.  Menses have been heavy like this for the past few months.     Review of outside data included-  Ultrasound: Uterus is normal in size with a smooth external contour measuring 9.7 x 7.3 x 5.5 cm. Centrally within the uterus at the endometrial complex level there is a solid-appearing hyperechoic mass like structure measuring 2.4 x 1.5 x 1.6 cm with intrinsic color Doppler blood flow which could represent a submucosal fibroid versus endometrial polyp. Also centrally there is a more heterogeneous mixed echogenicity lesion measuring 2.8 x 2 x 2.5 cm with some acoustic shadowing possibly representing a submucosal fibroid with intrinsic calcification. There is a uterine body intramural heterogeneously hypoechoic lesion compatible with a fibroid measuring 1.9 x  .9 x 2 cm as well as a posterior uterine body intramural hypoechoic 2.4 x 2.3 x 2.8 cm fibroid.    Discussed Blood/Blood Products: yes    Menstrual History:  OB History          5    Para   4    Term   3       1    AB   1    Living   4         SAB   1    IAB        Ectopic        Multiple        Live Births   4                      Past Medical History:   Diagnosis Date    Acid reflux     Anemia     CBC from 10/18/2023 hemoglobin 8.2; hematocrit 29.5    History of seizures     Last seizure was 2 years ago in 2021.  Patient states they are stress-induced, and since that time she is employed lifestyle changes to reduce her stress load.  Patient was prescribed Dilantin at that time which she took for 2 months and discontinued on her own.  Patient states in  she was having weekly seizures.       Past Surgical History:   Procedure Laterality Date      SECTION, LOW TRANSVERSE       SECTION, LOW TRANSVERSE  2003     SECTION, LOW TRANSVERSE  2004       Objective       General:   alert and oriented, in no acute distress   Skin:   normal   HEENT:  PERRLA   Lungs:   deferred   Heart:   deferred       Abdomen:  soft, non-tender; bowel sounds normal; no masses, no organomegaly   Pelvis:  Exam deferred.     Assessment/Plan   Counseling: Procedure, risks, reasons, benefits and complications (including injury to bowel, bladder, major blood vessel, ureter, bleeding, possibility of transfusion, infection, or fistula formation) reviewed in detail. Consent signed.

## 2023-12-20 NOTE — SIGNIFICANT EVENT
Spoke to pt who reports that pain is tolerable while taking Ibuprofen.  She ate well last evening without nausea or vomiting.  She reports minimal vaginal bleeding.  Discussed with pt that she needs to notify anesthesia in future about PONV and delayed awakening issues with understanding voiced.  Pt has F/U appt set up.

## 2023-12-26 LAB
LABORATORY COMMENT REPORT: NORMAL
PATH REPORT.FINAL DX SPEC: NORMAL
PATH REPORT.GROSS SPEC: NORMAL
PATH REPORT.RELEVANT HX SPEC: NORMAL
PATH REPORT.TOTAL CANCER: NORMAL

## 2024-01-10 ENCOUNTER — OFFICE VISIT (OUTPATIENT)
Dept: OBSTETRICS AND GYNECOLOGY | Facility: CLINIC | Age: 44
End: 2024-01-10
Payer: COMMERCIAL

## 2024-01-10 ENCOUNTER — APPOINTMENT (OUTPATIENT)
Dept: OBSTETRICS AND GYNECOLOGY | Facility: CLINIC | Age: 44
End: 2024-01-10
Payer: COMMERCIAL

## 2024-01-10 VITALS
WEIGHT: 163.4 LBS | DIASTOLIC BLOOD PRESSURE: 61 MMHG | SYSTOLIC BLOOD PRESSURE: 99 MMHG | HEIGHT: 68 IN | BODY MASS INDEX: 24.77 KG/M2

## 2024-01-10 DIAGNOSIS — D25.0 SUBMUCOUS UTERINE FIBROID: ICD-10-CM

## 2024-01-10 DIAGNOSIS — D50.0 IRON DEFICIENCY ANEMIA DUE TO CHRONIC BLOOD LOSS: ICD-10-CM

## 2024-01-10 DIAGNOSIS — R09.89 VASOMOTOR PHENOMENON: Primary | ICD-10-CM

## 2024-01-10 DIAGNOSIS — Z09 FOLLOW-UP EXAM: ICD-10-CM

## 2024-01-10 DIAGNOSIS — N92.1 MENORRHAGIA WITH IRREGULAR CYCLE: ICD-10-CM

## 2024-01-10 PROCEDURE — 1036F TOBACCO NON-USER: CPT | Performed by: OBSTETRICS & GYNECOLOGY

## 2024-01-10 PROCEDURE — 99214 OFFICE O/P EST MOD 30 MIN: CPT | Performed by: OBSTETRICS & GYNECOLOGY

## 2024-01-10 RX ORDER — NORETHINDRONE ACETATE AND ETHINYL ESTRADIOL .02; 1 MG/1; MG/1
1 TABLET ORAL DAILY
Qty: 28 TABLET | Refills: 12 | Status: SHIPPED | OUTPATIENT
Start: 2024-01-10 | End: 2024-04-15 | Stop reason: SDUPTHER

## 2024-01-10 ASSESSMENT — ENCOUNTER SYMPTOMS
CARDIOVASCULAR NEGATIVE: 0
MUSCULOSKELETAL NEGATIVE: 0
RESPIRATORY NEGATIVE: 0
NEUROLOGICAL NEGATIVE: 0
CONSTITUTIONAL NEGATIVE: 0
GASTROINTESTINAL NEGATIVE: 0
ENDOCRINE NEGATIVE: 0
EYES NEGATIVE: 0
PSYCHIATRIC NEGATIVE: 0
ALLERGIC/IMMUNOLOGIC NEGATIVE: 0
HEMATOLOGIC/LYMPHATIC NEGATIVE: 0

## 2024-01-10 ASSESSMENT — PAIN SCALES - GENERAL: PAINLEVEL: 0-NO PAIN

## 2024-01-10 NOTE — PROGRESS NOTES
SUBJECTIVE    43 y.o.  Having periods female presents for   Chief Complaint   Patient presents with    Follow-up     Pt here for FUV. Last Pap 21 wnl. HPV Negative. Mammogram 23 Benign.        Pt presents for post-op check after hysteroscopy and polypectomy/myomectomy on 2023.  Bled for 4d after surgery and then bleeding stopped.  She has not bled since.  No discharge, fevers, or chills.    Pathology: benign polyp and muscle tissue    Of note, she also is complaining of significant hot flashes and night sweats.  The flash starts in her chest and rises to her head. She will often need to change her sheets at night as well as her Pjs.  She has not previously tried hormonal therapy.  The symptoms are causing significant disruption to her daily routine.    OB/GYN History  Patient's last menstrual period was 10/16/2023 (approximate).    Social History     Substance and Sexual Activity   Sexual Activity Yes    Partners: Male    Birth control/protection: None       OB History    Para Term  AB Living   5 4 3 1 1 4   SAB IAB Ectopic Multiple Live Births   1       4      # Outcome Date GA Lbr Tucker/2nd Weight Sex Delivery Anes PTL Lv   5 Term 04    M CS-LTranv   KRYSTEN   4  03    F CS-LTranv  Y KRYSTEN   3 Term 11/10/00    M Vag-Spont   KRYSTEN   2 Term 99    F Vag-Spont  N KRYSTEN   1 SAB                Past Medical History  She has a past medical history of Acid reflux, Anemia, and History of seizures.    Surgical History  She has a past surgical history that includes  section, low transverse;  section, low transverse (2003);  section, low transverse (2004); and Hysteroscopy.     Social History  She reports that she has never smoked. She has never used smokeless tobacco. She reports that she does not currently use alcohol. She reports that she does not use drugs.    Screenings  Social Determinants of Health     Tobacco Use: Low Risk   "(1/10/2024)    Patient History     Smoking Tobacco Use: Never     Smokeless Tobacco Use: Never     Passive Exposure: Not on file   Alcohol Use: Not on file   Financial Resource Strain: Not on file   Food Insecurity: Not on file   Transportation Needs: Not on file   Physical Activity: Not on file   Stress: Not on file   Social Connections: Not on file   Intimate Partner Violence: Not on file   Depression: Not on file   Housing Stability: Not on file   Utilities: Not on file   Digital Equity: Not on file         OBJECTIVE  Vitals:    01/10/24 1613   BP: 99/61   Weight: 74.1 kg (163 lb 6.4 oz)   Height: 1.715 m (5' 7.5\")     Body mass index is 25.21 kg/m².     OBGyn Exam deferred    Last Pap: approximate date 5/2021 and was normal    Immunization History   Administered Date(s) Administered    Moderna SARS-CoV-2 Vaccination 10/01/2021        ASSESSMENT & PLAN  Problem List Items Addressed This Visit          Ob-Gyn Problems    Submucous uterine fibroid    Relevant Orders    CBC    Menorrhagia with irregular cycle    Relevant Orders    CBC       Other    Iron deficiency anemia due to chronic blood loss    Relevant Orders    CBC     Other Visit Diagnoses       Vasomotor phenomenon    -  Primary    Relevant Medications    norethindrone ac-eth estradioL (Loestrin 1/20, 21,) 1-20 mg-mcg tablet    Follow-up exam                Follow up: Good recovery from surgery. Will plan to check CBC in one month to re-evaluate anemia.    Discussed symptoms of tony-menopause with hot flashes/night sweats.  Reviewed option of low dose OCP to control vasomotor symptoms and capture cycle. Pt interested in trial.  Follow up 3 months to evaluate progress.    Miguel Courtney MD  Obstetrics & Gynecology  01/10/24  "

## 2024-04-15 ENCOUNTER — OFFICE VISIT (OUTPATIENT)
Dept: OBSTETRICS AND GYNECOLOGY | Facility: CLINIC | Age: 44
End: 2024-04-15
Payer: COMMERCIAL

## 2024-04-15 VITALS
SYSTOLIC BLOOD PRESSURE: 120 MMHG | WEIGHT: 167 LBS | BODY MASS INDEX: 26.21 KG/M2 | HEIGHT: 67 IN | DIASTOLIC BLOOD PRESSURE: 72 MMHG

## 2024-04-15 DIAGNOSIS — N92.1 MENORRHAGIA WITH IRREGULAR CYCLE: Primary | ICD-10-CM

## 2024-04-15 DIAGNOSIS — D50.0 IRON DEFICIENCY ANEMIA DUE TO CHRONIC BLOOD LOSS: ICD-10-CM

## 2024-04-15 DIAGNOSIS — D25.0 SUBMUCOUS UTERINE FIBROID: ICD-10-CM

## 2024-04-15 DIAGNOSIS — R09.89 VASOMOTOR PHENOMENON: ICD-10-CM

## 2024-04-15 PROCEDURE — 99214 OFFICE O/P EST MOD 30 MIN: CPT | Performed by: OBSTETRICS & GYNECOLOGY

## 2024-04-15 PROCEDURE — 1036F TOBACCO NON-USER: CPT | Performed by: OBSTETRICS & GYNECOLOGY

## 2024-04-15 RX ORDER — NORETHINDRONE ACETATE AND ETHINYL ESTRADIOL .02; 1 MG/1; MG/1
1 TABLET ORAL DAILY
Qty: 28 TABLET | Refills: 12 | Status: SHIPPED | OUTPATIENT
Start: 2024-04-15 | End: 2025-04-15

## 2024-04-15 ASSESSMENT — PATIENT HEALTH QUESTIONNAIRE - PHQ9
2. FEELING DOWN, DEPRESSED OR HOPELESS: NOT AT ALL
SUM OF ALL RESPONSES TO PHQ9 QUESTIONS 1 AND 2: 0
1. LITTLE INTEREST OR PLEASURE IN DOING THINGS: NOT AT ALL

## 2024-04-15 ASSESSMENT — PAIN SCALES - GENERAL: PAINLEVEL: 0-NO PAIN

## 2024-04-15 ASSESSMENT — ENCOUNTER SYMPTOMS
LOSS OF SENSATION IN FEET: 0
OCCASIONAL FEELINGS OF UNSTEADINESS: 0
DEPRESSION: 0

## 2024-04-15 NOTE — PROGRESS NOTES
SUBJECTIVE    43 y.o.  Having periods female presents for   Chief Complaint   Patient presents with    Follow-up     Pt here for fpllow up appt for hysteroscopy.Pt states having a period middle of 2024 that lasted for 9 days. Pt states having spotting which started yesterday      Pt presents for bleeding problems.  She initially presented in 2023 with known fibroids and heavy, irregular menses.  Her Hemoglobin in 10/2023 was 8.2.  She had a submucosal fibroid on ultrasound and thus underwent a hysteroscopy with what turned out to be both a polypectomy as well as myomectomy.    On post-op follow up on 1/10/2024 a repeat CBC was ordered (but has not been obtained). She reported some increased menopausal symptoms (hot flashes, night sweats) and a low-dose OCP was prescribed to help manage both these symptoms and her bleeding.  The pharmacy moved locations, though, and so she unable to pick this up.  She continues to have these symptoms.    LMP 3/15/2024 and lasted about 9 days. Started spotting yesterday.  This was the first menses since the surgery.     OB/GYN History  Patient's last menstrual period was 03/15/2024 (approximate).    Social History     Substance and Sexual Activity   Sexual Activity Yes    Partners: Male    Birth control/protection: None         OB History    Para Term  AB Living   5 4 3 1 1 4   SAB IAB Ectopic Multiple Live Births   1       4      # Outcome Date GA Lbr Tucker/2nd Weight Sex Delivery Anes PTL Lv   5 Term 04    M CS-LTranv   KRYSTEN   4  03    F CS-LTranv  Y KRYSTEN   3 Term 11/10/00    M Vag-Spont   KRYSTEN   2 Term 99    F Vag-Spont  N KRYSTEN   1 SAB                Past Medical History  She has a past medical history of Acid reflux, Anemia, and History of seizures.    Surgical History  She has a past surgical history that includes  section, low transverse;  section, low transverse (2003);  section, low transverse  (06/21/2004); and Hysteroscopy.     Social History  She reports that she has never smoked. She has never used smokeless tobacco. She reports that she does not currently use alcohol. She reports that she does not use drugs.    Screenings  Social Determinants of Health     Tobacco Use: Low Risk  (4/15/2024)    Patient History     Smoking Tobacco Use: Never     Smokeless Tobacco Use: Never     Passive Exposure: Not on file   Alcohol Use: Not on file   Financial Resource Strain: Medium Risk (9/26/2023)    Received from [facility]    Overall Financial Resource Strain (CARDIA)     Difficulty of Paying Living Expenses: Somewhat hard   Food Insecurity: No Food Insecurity (9/26/2023)    Received from [facility]    Hunger Vital Sign     Worried About Running Out of Food in the Last Year: Never true     Ran Out of Food in the Last Year: Never true   Transportation Needs: No Transportation Needs (9/26/2023)    Received from [facility]    PRAPARE - Transportation     Lack of Transportation (Medical): No     Lack of Transportation (Non-Medical): No   Physical Activity: Not on File (9/5/2019)    Received from [facility]     Physical Activity     Physical Activity: 0   Stress: Not on File (9/5/2019)    Received from [facility]     Stress     Stress: 0   Social Connections: Not on File (9/5/2019)    Received from [facility]     Social Connections     Social Connections and Isolation: 0   Intimate Partner Violence: Not At Risk (9/26/2023)    Received from [facility]    Humiliation, Afraid, Rape, and Kick questionnaire     Fear of Current or Ex-Partner: No     Emotionally Abused: No     Physically Abused: No     Sexually Abused: No   Depression: Not at risk (4/15/2024)    PHQ-2     PHQ-2 Score: 0   Housing Stability: Unknown (9/26/2023)    Received from [facility]    Housing Stability Vital Sign     Unable to Pay for Housing in the Last Year: No     Number of Places Lived in the Last Year: Not on file     Unstable Housing in the Last Year: No  "  Utilities: Not on file   Digital Equity: Not on file   Health Literacy: Not on file         OBJECTIVE  Vitals:    04/15/24 1639   BP: 120/72   Weight: 75.8 kg (167 lb)   Height: 1.702 m (5' 7\")     Body mass index is 26.16 kg/m².     Chaperone: Present: yes  OBGyn Exam deferred      Immunization History   Administered Date(s) Administered    Moderna SARS-CoV-2 Vaccination 10/01/2021        ASSESSMENT & PLAN  Problem List Items Addressed This Visit          Ob-Gyn Problems    Submucous uterine fibroid    Menorrhagia with irregular cycle - Primary       Other    Iron deficiency anemia due to chronic blood loss     Other Visit Diagnoses       Vasomotor phenomenon        Relevant Medications    norethindrone ac-eth estradioL (Loestrin 1/20, 21,) 1-20 mg-mcg tablet            Follow up: Will resume OCPs since she was unable to pick these up previously.  Will also follow up for blood draw for repeat CBC to check level of anemia.  Plan to RTO for re-evaluation in 3 months.    Miguel Courtney MD  Obstetrics & Gynecology  04/15/24  "

## 2024-05-30 ENCOUNTER — TRANSCRIBE ORDERS (OUTPATIENT)
Dept: ORTHOPEDIC SURGERY | Facility: HOSPITAL | Age: 44
End: 2024-05-30
Payer: COMMERCIAL

## 2024-05-30 DIAGNOSIS — M54.50 LOW BACK PAIN, UNSPECIFIED BACK PAIN LATERALITY, UNSPECIFIED CHRONICITY, UNSPECIFIED WHETHER SCIATICA PRESENT: ICD-10-CM

## 2024-06-04 ENCOUNTER — APPOINTMENT (OUTPATIENT)
Dept: RADIOLOGY | Facility: CLINIC | Age: 44
End: 2024-06-04
Payer: COMMERCIAL

## 2024-06-04 ENCOUNTER — APPOINTMENT (OUTPATIENT)
Dept: ORTHOPEDIC SURGERY | Facility: CLINIC | Age: 44
End: 2024-06-04
Payer: COMMERCIAL

## 2024-06-10 ENCOUNTER — APPOINTMENT (OUTPATIENT)
Dept: ORTHOPEDIC SURGERY | Facility: CLINIC | Age: 44
End: 2024-06-10
Payer: COMMERCIAL

## 2024-06-10 DIAGNOSIS — M25.572 ACUTE LEFT ANKLE PAIN: ICD-10-CM

## 2024-06-20 ENCOUNTER — APPOINTMENT (OUTPATIENT)
Dept: ORTHOPEDIC SURGERY | Facility: CLINIC | Age: 44
End: 2024-06-20
Payer: COMMERCIAL

## 2024-06-20 ENCOUNTER — TRANSCRIBE ORDERS (OUTPATIENT)
Dept: ORTHOPEDIC SURGERY | Facility: HOSPITAL | Age: 44
End: 2024-06-20

## 2024-06-20 DIAGNOSIS — M54.50 LOW BACK PAIN, UNSPECIFIED BACK PAIN LATERALITY, UNSPECIFIED CHRONICITY, UNSPECIFIED WHETHER SCIATICA PRESENT: ICD-10-CM

## 2024-06-25 ENCOUNTER — APPOINTMENT (OUTPATIENT)
Dept: RADIOLOGY | Facility: CLINIC | Age: 44
End: 2024-06-25
Payer: COMMERCIAL

## 2024-09-13 ENCOUNTER — APPOINTMENT (OUTPATIENT)
Dept: PHYSICAL MEDICINE AND REHAB | Facility: CLINIC | Age: 44
End: 2024-09-13
Payer: COMMERCIAL

## 2024-09-13 VITALS — HEART RATE: 96 BPM | SYSTOLIC BLOOD PRESSURE: 114 MMHG | RESPIRATION RATE: 20 BRPM | DIASTOLIC BLOOD PRESSURE: 73 MMHG

## 2024-09-13 DIAGNOSIS — G89.29 CHRONIC MIDLINE LOW BACK PAIN WITHOUT SCIATICA: ICD-10-CM

## 2024-09-13 DIAGNOSIS — M40.04 POSTURAL KYPHOSIS OF THORACIC REGION: Primary | ICD-10-CM

## 2024-09-13 DIAGNOSIS — M54.50 CHRONIC MIDLINE LOW BACK PAIN WITHOUT SCIATICA: ICD-10-CM

## 2024-09-13 PROCEDURE — 99204 OFFICE O/P NEW MOD 45 MIN: CPT | Performed by: PHYSICAL MEDICINE & REHABILITATION

## 2024-09-13 RX ORDER — MELOXICAM 15 MG/1
15 TABLET ORAL DAILY PRN
Qty: 30 TABLET | Refills: 2 | Status: SHIPPED | OUTPATIENT
Start: 2024-09-13 | End: 2024-09-13

## 2024-09-13 RX ORDER — MELOXICAM 15 MG/1
15 TABLET ORAL DAILY PRN
Qty: 30 TABLET | Refills: 2 | Status: SHIPPED | OUTPATIENT
Start: 2024-09-13 | End: 2024-10-13

## 2024-09-13 ASSESSMENT — PAIN SCALES - GENERAL: PAINLEVEL: 8

## 2024-11-15 ENCOUNTER — APPOINTMENT (OUTPATIENT)
Dept: PHYSICAL MEDICINE AND REHAB | Facility: CLINIC | Age: 44
End: 2024-11-15
Payer: COMMERCIAL

## 2025-08-07 ENCOUNTER — APPOINTMENT (OUTPATIENT)
Dept: PRIMARY CARE | Facility: CLINIC | Age: 45
End: 2025-08-07

## 2025-08-22 ENCOUNTER — APPOINTMENT (OUTPATIENT)
Dept: PRIMARY CARE | Facility: CLINIC | Age: 45
End: 2025-08-22
Payer: COMMERCIAL

## 2026-02-17 ENCOUNTER — APPOINTMENT (OUTPATIENT)
Dept: PRIMARY CARE | Facility: CLINIC | Age: 46
End: 2026-02-17
Payer: COMMERCIAL

## (undated) DEVICE — TOWEL, SURGICAL, NEURO, O/R, 16 X 26, BLUE, STERILE

## (undated) DEVICE — ACCESSORY, FLUID MANAGEMENT, AVETA

## (undated) DEVICE — Device

## (undated) DEVICE — ACCESSORY, AVETA, WASTE MANAGEMENT WITH CAP

## (undated) DEVICE — DRAPE, PAD, PREP, W/ 9 IN CUFF, 24 X 41, LF, NS

## (undated) DEVICE — BASIN SET, D & C

## (undated) DEVICE — GLOVE, SURGICAL, PROTEXIS,  6.0, PF, LATEX

## (undated) DEVICE — TRAY, MINOR, SINGLE BASIN, STERILE

## (undated) DEVICE — DEVICE, RESECTING, AVETA WAVE, 3.9MM

## (undated) DEVICE — MANIFOLD, 4 PORT NEPTUNE STANDARD